# Patient Record
Sex: FEMALE | Race: WHITE | NOT HISPANIC OR LATINO | URBAN - METROPOLITAN AREA
[De-identification: names, ages, dates, MRNs, and addresses within clinical notes are randomized per-mention and may not be internally consistent; named-entity substitution may affect disease eponyms.]

---

## 2018-09-02 ENCOUNTER — EMERGENCY (EMERGENCY)
Facility: HOSPITAL | Age: 53
LOS: 1 days | Discharge: HOME | End: 2018-09-02
Attending: EMERGENCY MEDICINE | Admitting: EMERGENCY MEDICINE
Payer: COMMERCIAL

## 2018-09-02 VITALS
SYSTOLIC BLOOD PRESSURE: 167 MMHG | RESPIRATION RATE: 16 BRPM | DIASTOLIC BLOOD PRESSURE: 75 MMHG | HEART RATE: 67 BPM | OXYGEN SATURATION: 99 %

## 2018-09-02 VITALS
HEART RATE: 75 BPM | DIASTOLIC BLOOD PRESSURE: 75 MMHG | SYSTOLIC BLOOD PRESSURE: 190 MMHG | OXYGEN SATURATION: 97 % | TEMPERATURE: 98 F | RESPIRATION RATE: 16 BRPM

## 2018-09-02 LAB
ALBUMIN SERPL ELPH-MCNC: 4 G/DL — SIGNIFICANT CHANGE UP (ref 3.3–5)
ALP SERPL-CCNC: 42 U/L — SIGNIFICANT CHANGE UP (ref 40–120)
ALT FLD-CCNC: 29 U/L — SIGNIFICANT CHANGE UP (ref 4–33)
APPEARANCE UR: CLEAR — SIGNIFICANT CHANGE UP
AST SERPL-CCNC: 19 U/L — SIGNIFICANT CHANGE UP (ref 4–32)
B-OH-BUTYR SERPL-SCNC: 0.2 MMOL/L — SIGNIFICANT CHANGE UP (ref 0–0.4)
BASE EXCESS BLDV CALC-SCNC: 3.1 MMOL/L — SIGNIFICANT CHANGE UP
BASE EXCESS BLDV CALC-SCNC: 4.2 MMOL/L — SIGNIFICANT CHANGE UP
BASOPHILS # BLD AUTO: 0.05 K/UL — SIGNIFICANT CHANGE UP (ref 0–0.2)
BASOPHILS NFR BLD AUTO: 0.7 % — SIGNIFICANT CHANGE UP (ref 0–2)
BILIRUB SERPL-MCNC: 0.9 MG/DL — SIGNIFICANT CHANGE UP (ref 0.2–1.2)
BILIRUB UR-MCNC: NEGATIVE — SIGNIFICANT CHANGE UP
BLOOD GAS VENOUS - CREATININE: < 0.36 MG/DL — LOW (ref 0.5–1.3)
BLOOD GAS VENOUS - CREATININE: < 0.36 MG/DL — LOW (ref 0.5–1.3)
BLOOD UR QL VISUAL: NEGATIVE — SIGNIFICANT CHANGE UP
BUN SERPL-MCNC: 14 MG/DL — SIGNIFICANT CHANGE UP (ref 7–23)
CALCIUM SERPL-MCNC: 9.4 MG/DL — SIGNIFICANT CHANGE UP (ref 8.4–10.5)
CHLORIDE BLDV-SCNC: 102 MMOL/L — SIGNIFICANT CHANGE UP (ref 96–108)
CHLORIDE BLDV-SCNC: 105 MMOL/L — SIGNIFICANT CHANGE UP (ref 96–108)
CHLORIDE SERPL-SCNC: 97 MMOL/L — LOW (ref 98–107)
CO2 SERPL-SCNC: 26 MMOL/L — SIGNIFICANT CHANGE UP (ref 22–31)
COLOR SPEC: SIGNIFICANT CHANGE UP
CREAT SERPL-MCNC: 0.51 MG/DL — SIGNIFICANT CHANGE UP (ref 0.5–1.3)
EOSINOPHIL # BLD AUTO: 0.12 K/UL — SIGNIFICANT CHANGE UP (ref 0–0.5)
EOSINOPHIL NFR BLD AUTO: 1.6 % — SIGNIFICANT CHANGE UP (ref 0–6)
GAS PNL BLDV: 134 MMOL/L — LOW (ref 136–146)
GAS PNL BLDV: 135 MMOL/L — LOW (ref 136–146)
GLUCOSE BLDV-MCNC: 219 — HIGH (ref 70–99)
GLUCOSE BLDV-MCNC: 276 — HIGH (ref 70–99)
GLUCOSE SERPL-MCNC: 271 MG/DL — HIGH (ref 70–99)
GLUCOSE UR-MCNC: HIGH
HCO3 BLDV-SCNC: 26 MMOL/L — SIGNIFICANT CHANGE UP (ref 20–27)
HCO3 BLDV-SCNC: 27 MMOL/L — SIGNIFICANT CHANGE UP (ref 20–27)
HCT VFR BLD CALC: 40 % — SIGNIFICANT CHANGE UP (ref 34.5–45)
HCT VFR BLDV CALC: 39.6 % — SIGNIFICANT CHANGE UP (ref 34.5–45)
HCT VFR BLDV CALC: 43.4 % — SIGNIFICANT CHANGE UP (ref 34.5–45)
HGB BLD-MCNC: 13.5 G/DL — SIGNIFICANT CHANGE UP (ref 11.5–15.5)
HGB BLDV-MCNC: 12.9 G/DL — SIGNIFICANT CHANGE UP (ref 11.5–15.5)
HGB BLDV-MCNC: 14.1 G/DL — SIGNIFICANT CHANGE UP (ref 11.5–15.5)
IMM GRANULOCYTES # BLD AUTO: 0.04 # — SIGNIFICANT CHANGE UP
IMM GRANULOCYTES NFR BLD AUTO: 0.5 % — SIGNIFICANT CHANGE UP (ref 0–1.5)
KETONES UR-MCNC: NEGATIVE — SIGNIFICANT CHANGE UP
LACTATE BLDV-MCNC: 1.3 MMOL/L — SIGNIFICANT CHANGE UP (ref 0.5–2)
LACTATE BLDV-MCNC: 2.2 MMOL/L — HIGH (ref 0.5–2)
LEUKOCYTE ESTERASE UR-ACNC: NEGATIVE — SIGNIFICANT CHANGE UP
LIDOCAIN IGE QN: 55.6 U/L — SIGNIFICANT CHANGE UP (ref 7–60)
LYMPHOCYTES # BLD AUTO: 1.58 K/UL — SIGNIFICANT CHANGE UP (ref 1–3.3)
LYMPHOCYTES # BLD AUTO: 20.7 % — SIGNIFICANT CHANGE UP (ref 13–44)
MCHC RBC-ENTMCNC: 27.7 PG — SIGNIFICANT CHANGE UP (ref 27–34)
MCHC RBC-ENTMCNC: 33.8 % — SIGNIFICANT CHANGE UP (ref 32–36)
MCV RBC AUTO: 82.1 FL — SIGNIFICANT CHANGE UP (ref 80–100)
MONOCYTES # BLD AUTO: 0.43 K/UL — SIGNIFICANT CHANGE UP (ref 0–0.9)
MONOCYTES NFR BLD AUTO: 5.6 % — SIGNIFICANT CHANGE UP (ref 2–14)
NEUTROPHILS # BLD AUTO: 5.43 K/UL — SIGNIFICANT CHANGE UP (ref 1.8–7.4)
NEUTROPHILS NFR BLD AUTO: 70.9 % — SIGNIFICANT CHANGE UP (ref 43–77)
NITRITE UR-MCNC: NEGATIVE — SIGNIFICANT CHANGE UP
NRBC # FLD: 0 — SIGNIFICANT CHANGE UP
PCO2 BLDV: 47 MMHG — SIGNIFICANT CHANGE UP (ref 41–51)
PCO2 BLDV: 49 MMHG — SIGNIFICANT CHANGE UP (ref 41–51)
PH BLDV: 7.38 PH — SIGNIFICANT CHANGE UP (ref 7.32–7.43)
PH BLDV: 7.4 PH — SIGNIFICANT CHANGE UP (ref 7.32–7.43)
PH UR: 6.5 — SIGNIFICANT CHANGE UP (ref 5–8)
PLATELET # BLD AUTO: 194 K/UL — SIGNIFICANT CHANGE UP (ref 150–400)
PMV BLD: 9.9 FL — SIGNIFICANT CHANGE UP (ref 7–13)
PO2 BLDV: 47 MMHG — HIGH (ref 35–40)
PO2 BLDV: 53 MMHG — HIGH (ref 35–40)
POTASSIUM BLDV-SCNC: 3.9 MMOL/L — SIGNIFICANT CHANGE UP (ref 3.4–4.5)
POTASSIUM BLDV-SCNC: 4 MMOL/L — SIGNIFICANT CHANGE UP (ref 3.4–4.5)
POTASSIUM SERPL-MCNC: 4 MMOL/L — SIGNIFICANT CHANGE UP (ref 3.5–5.3)
POTASSIUM SERPL-SCNC: 4 MMOL/L — SIGNIFICANT CHANGE UP (ref 3.5–5.3)
PROT SERPL-MCNC: 6.8 G/DL — SIGNIFICANT CHANGE UP (ref 6–8.3)
PROT UR-MCNC: NEGATIVE — SIGNIFICANT CHANGE UP
RBC # BLD: 4.87 M/UL — SIGNIFICANT CHANGE UP (ref 3.8–5.2)
RBC # FLD: 12.5 % — SIGNIFICANT CHANGE UP (ref 10.3–14.5)
SAO2 % BLDV: 80.1 % — SIGNIFICANT CHANGE UP (ref 60–85)
SAO2 % BLDV: 85.8 % — HIGH (ref 60–85)
SODIUM SERPL-SCNC: 138 MMOL/L — SIGNIFICANT CHANGE UP (ref 135–145)
SP GR SPEC: 1.01 — SIGNIFICANT CHANGE UP (ref 1–1.04)
TROPONIN T, HIGH SENSITIVITY: 7 NG/L — SIGNIFICANT CHANGE UP (ref ?–14)
TROPONIN T, HIGH SENSITIVITY: 7 NG/L — SIGNIFICANT CHANGE UP (ref ?–14)
UROBILINOGEN FLD QL: NORMAL — SIGNIFICANT CHANGE UP
WBC # BLD: 7.65 K/UL — SIGNIFICANT CHANGE UP (ref 3.8–10.5)
WBC # FLD AUTO: 7.65 K/UL — SIGNIFICANT CHANGE UP (ref 3.8–10.5)

## 2018-09-02 PROCEDURE — 70498 CT ANGIOGRAPHY NECK: CPT | Mod: 26

## 2018-09-02 PROCEDURE — 99285 EMERGENCY DEPT VISIT HI MDM: CPT

## 2018-09-02 PROCEDURE — 70496 CT ANGIOGRAPHY HEAD: CPT | Mod: 26

## 2018-09-02 PROCEDURE — 71046 X-RAY EXAM CHEST 2 VIEWS: CPT | Mod: 26

## 2018-09-02 PROCEDURE — 76705 ECHO EXAM OF ABDOMEN: CPT | Mod: 26

## 2018-09-02 PROCEDURE — 70450 CT HEAD/BRAIN W/O DYE: CPT | Mod: 26,59

## 2018-09-02 RX ORDER — SODIUM CHLORIDE 9 MG/ML
1000 INJECTION INTRAMUSCULAR; INTRAVENOUS; SUBCUTANEOUS ONCE
Qty: 0 | Refills: 0 | Status: COMPLETED | OUTPATIENT
Start: 2018-09-02 | End: 2018-09-02

## 2018-09-02 RX ORDER — METOCLOPRAMIDE HCL 10 MG
10 TABLET ORAL ONCE
Qty: 0 | Refills: 0 | Status: COMPLETED | OUTPATIENT
Start: 2018-09-02 | End: 2018-09-02

## 2018-09-02 RX ORDER — NEBIVOLOL HYDROCHLORIDE 5 MG/1
20 TABLET ORAL ONCE
Qty: 0 | Refills: 0 | Status: COMPLETED | OUTPATIENT
Start: 2018-09-02 | End: 2018-09-02

## 2018-09-02 RX ORDER — MORPHINE SULFATE 50 MG/1
4 CAPSULE, EXTENDED RELEASE ORAL ONCE
Qty: 0 | Refills: 0 | Status: DISCONTINUED | OUTPATIENT
Start: 2018-09-02 | End: 2018-09-02

## 2018-09-02 RX ORDER — MECLIZINE HCL 12.5 MG
25 TABLET ORAL ONCE
Qty: 0 | Refills: 0 | Status: COMPLETED | OUTPATIENT
Start: 2018-09-02 | End: 2018-09-02

## 2018-09-02 RX ORDER — MECLIZINE HCL 12.5 MG
1 TABLET ORAL
Qty: 12 | Refills: 0 | OUTPATIENT
Start: 2018-09-02 | End: 2018-09-05

## 2018-09-02 RX ADMIN — SODIUM CHLORIDE 1000 MILLILITER(S): 9 INJECTION INTRAMUSCULAR; INTRAVENOUS; SUBCUTANEOUS at 10:24

## 2018-09-02 RX ADMIN — SODIUM CHLORIDE 1000 MILLILITER(S): 9 INJECTION INTRAMUSCULAR; INTRAVENOUS; SUBCUTANEOUS at 13:23

## 2018-09-02 RX ADMIN — Medication 25 MILLIGRAM(S): at 13:23

## 2018-09-02 RX ADMIN — Medication 25 MILLIGRAM(S): at 10:27

## 2018-09-02 RX ADMIN — Medication 10 MILLIGRAM(S): at 10:27

## 2018-09-02 RX ADMIN — NEBIVOLOL HYDROCHLORIDE 20 MILLIGRAM(S): 5 TABLET ORAL at 16:47

## 2018-09-02 NOTE — CONSULT NOTE ADULT - SUBJECTIVE AND OBJECTIVE BOX
Neurology Consult    Reason for consult: Vertigo	    HPI:  53y Female PMH HTN, HLD, DM, R [place] palsy with residual R facial weakness presenting with dizziness. As per patient, had a transient episode of dizziness two weeks ago when she stood up rapidly, described as "room spinning around her", resolved almost instantly. On the thursday prior to ED presentation, patient had a very strong neck massage which caused considerable soreness after which she had some transient vertigo symptoms as well. On the day prior to admission, patient went to a salon where she had her hair washed with her neck positioned over a sink. After this, patient started feeling severe vertiginous symptoms, nausea, vomiting which continued constantly until today. Currently, patient feels vertigo has slightly improved after taking meclizine. Patient also complains of tinnitus, denies ear fullness/diplopia/weakness/numbness.     REVIEW OF SYSTEMS:  As above    MEDICATIONS    PMH: Bell palsy     PSH: No significant past surgical history    FAMILY HISTORY:    No history of dementia, strokes, or seizures     SOCIAL HISTORY:  No history of tobacco or alcohol use     Allergies    No Known Allergies    Intolerances    Vital Signs Last 24 Hrs  T(C): 36.4 (02 Sep 2018 09:00), Max: 36.4 (02 Sep 2018 09:00)  T(F): 97.5 (02 Sep 2018 09:00), Max: 97.5 (02 Sep 2018 09:00)  HR: 66 (02 Sep 2018 12:18) (66 - 75)  BP: 175/90 (02 Sep 2018 12:18) (175/90 - 196/93)  BP(mean): --  RR: 16 (02 Sep 2018 12:18) (16 - 16)  SpO2: 100% (02 Sep 2018 12:18) (97% - 100%)    Neurological Examination:    Mental Status: Patient is alert, awake, oriented X3. Patient is fluent, no dysarthria, no aphasia. Follows commands well and able to answer questions appropriately. Mood and affect normal.    Cranial Nerves: PERRL, EOMI, visual field intact, V1-V3 intact, no gross facial asymmetry, tongue/uvula midline    Motor Exam: No drift  Right upper extremity: 5/5  Left upper extremity: 5/5  Right lower extremity: 5/5  Left lower extremity: 5/5    Normal bulk/tone    Sensory: Intact to light touch bilaterally. No extinction    Coordination: Finger to nose intact bilaterally     Gait: Normal      Colp Hallpike negative    Reflexes: Bilateral 2+ Biceps, Brachial, Patellar, Ankle  Plantar: Down bilateral    GENERAL: No acute distress  HEENT:  Normocephalic, atraumatic  EXTREMITIES: No edema, clubbing, cyanosis  MUSCULOSKELETAL: Normal range of motion  SKIN: No rashes    LABS:  CBC Full  -  ( 02 Sep 2018 10:00 )  WBC Count : 7.65 K/uL  Hemoglobin : 13.5 g/dL  Hematocrit : 40.0 %  Platelet Count - Automated : 194 K/uL  Mean Cell Volume : 82.1 fL  Mean Cell Hemoglobin : 27.7 pg  Mean Cell Hemoglobin Concentration : 33.8 %  Auto Neutrophil # : 5.43 K/uL  Auto Lymphocyte # : 1.58 K/uL  Auto Monocyte # : 0.43 K/uL  Auto Eosinophil # : 0.12 K/uL  Auto Basophil # : 0.05 K/uL  Auto Neutrophil % : 70.9 %  Auto Lymphocyte % : 20.7 %  Auto Monocyte % : 5.6 %  Auto Eosinophil % : 1.6 %  Auto Basophil % : 0.7 %    Urinalysis Basic - ( 02 Sep 2018 10:00 )    Color: LIGHT YELLOW / Appearance: CLEAR / S.011 / pH: 6.5  Gluc: MODERATE / Ketone: NEGATIVE  / Bili: NEGATIVE / Urobili: NORMAL   Blood: NEGATIVE / Protein: NEGATIVE / Nitrite: NEGATIVE   Leuk Esterase: NEGATIVE / RBC: x / WBC x   Sq Epi: x / Non Sq Epi: x / Bacteria: x          138  |  97<L>  |  14  ----------------------------<  271<H>  4.0   |  26  |  0.51    Ca    9.4      02 Sep 2018 10:00    TPro  6.8  /  Alb  4.0  /  TBili  0.9  /  DBili  x   /  AST  19  /  ALT  29  /  AlkPhos  42  -    LIVER FUNCTIONS - ( 02 Sep 2018 10:00 )  Alb: 4.0 g/dL / Pro: 6.8 g/dL / ALK PHOS: 42 u/L / ALT: 29 u/L / AST: 19 u/L / GGT: x           Hemoglobin A1C:           RADIOLOGY:  CT head:  MRI:

## 2018-09-02 NOTE — ED PROVIDER NOTE - PHYSICAL EXAMINATION
laying flat in bed, dry mm. non icteric. no nystagmus. EOMI. slight facial droop (at baseline) no drooling. normal S1-S2 No resp distress. able to speak in full and clear sentences. no wheeze, rales or stridor. soft non distended tender at the RUQ, neg Ruiz no guarding no rebound   no pedal edema. no calf tenderness. normal pulses bilateral feet. AOx3, no focal deficits. CN 2-12 grossly intact. no meningeal signs.

## 2018-09-02 NOTE — CONSULT NOTE ADULT - ASSESSMENT
53y Female PMH HTN, HLD, DM, R [place] palsy with residual R facial weakness presenting with vertigo. Patient had a prior episode of vertigo a few weeks ago, positional, transient, also complaining of tinnitus; prior presentation most consistent with BPPV(though jesse hallpike currently negative). However, patient with several vascular comorbidities as well as recent history of neck manipulation/trauma, after which she experienced severe worsening of vertiginous symptoms, concerning for possible dissection.     Recommendations:  Continue meclizine, IVF  CTA H/N for possible dissection  Antiemetics for nausea/vomiting

## 2018-09-02 NOTE — ED ADULT NURSE NOTE - NSIMPLEMENTINTERV_GEN_ALL_ED
Implemented All Universal Safety Interventions:  [place] to call system. Call bell, personal items and telephone within reach. Instruct patient to call for assistance. Room bathroom lighting operational. Non-slip footwear when patient is off stretcher. Physically safe environment: no spills, clutter or unnecessary equipment. Stretcher in lowest position, wheels locked, appropriate side rails in place.

## 2018-09-02 NOTE — ED PROVIDER NOTE - OBJECTIVE STATEMENT
54 y/o F with a PMHx of Bell palsy presents to the ED c/o of dizziness, nausea and vomiting that began yesterday. Patient reports that yesterday morning she woke up and felt dizzy and went to work until 3 pm. After that the Patient went to the hair salon to get her hair done. When her head was tilted back the Patient states she started vomiting. The patient then went home and slept and after waking up felt fine. This morning upon awakening patient felt the same symptoms as yesterday which prompted her to come to the ED. Patient reports having a history of these symptoms 15 years ago. Denies LOC, numbness, weakness or any other related symptoms. 52 y/o F with a PMHx of HTN,  HLD, DM, Bell palsy with residual facial paralysis presents to the ED c/o of dizziness, nausea and vomiting that began yesterday. Patient reports that yesterday morning she woke up and felt dizzy and went to work until 3 pm. After that the Patient went to the hair salon to get her hair done. When her head was tilted back the Patient states she started vomiting. The patient then went home and slept and after waking up felt fine. This morning upon awakening patient felt the same symptoms as yesterday which prompted her to come to the ED. Patient reports having a history of these symptoms 15 years ago. Describes "room spinning" Dizziness, nausea no vomit. Upper right abdominal pain since this morning, non radiating. no chest pain no sob. no fever or chills. no diarrhea. no headache. no neck pain. no change in vision. no slurred speech. Denies LOC, numbness, weakness or any other related symptoms. no urinary complaints. not on AC> no change in meds. States FS have been in mid 200 last week. pt on Insulin for DM, cant recall other medication. no sick contact. no travel hx.

## 2018-09-02 NOTE — ED PROVIDER NOTE - PROGRESS NOTE DETAILS
pt endorsing "a little better" "still dizzy when move head back" "Im hungry" nausea improved. no numbness or weakness. pending us read. will order ct angio head/neck, consult neurology. pt informed. pt endorsing "a little better" "still dizzy when move head back"  nausea improved.  generalized weakness. pending us read. will order ct angio head/neck, consult neurology pt feeling better, able to walk now. not dizzy. nl gait. no focal deficits. no cp or sob. ct neg for dissection. pt asking to eat and would like to go home. Neurology consult appreciated. pt dressed, feeling better. missed her BP meds today. Ordered bystolic  from pharmacy. pt walked not dizzy. no cp or sob Repeat  not dizzy or lightheaded. no cp or sob. Feeling better. plan dc home.   Close pmd and neurology follow up. pt asking to be discharged home. DC w meclizine prescription

## 2018-09-03 LAB
BACTERIA UR CULT: SIGNIFICANT CHANGE UP
SPECIMEN SOURCE: SIGNIFICANT CHANGE UP

## 2018-09-07 LAB
BACTERIA BLD CULT: SIGNIFICANT CHANGE UP
BACTERIA BLD CULT: SIGNIFICANT CHANGE UP

## 2018-10-08 ENCOUNTER — APPOINTMENT (OUTPATIENT)
Dept: VASCULAR SURGERY | Facility: CLINIC | Age: 53
End: 2018-10-08
Payer: COMMERCIAL

## 2018-10-08 VITALS
TEMPERATURE: 98.3 F | BODY MASS INDEX: 34.02 KG/M2 | HEART RATE: 83 BPM | HEIGHT: 63 IN | SYSTOLIC BLOOD PRESSURE: 165 MMHG | DIASTOLIC BLOOD PRESSURE: 85 MMHG | WEIGHT: 192 LBS

## 2018-10-08 PROCEDURE — 93971 EXTREMITY STUDY: CPT

## 2018-10-08 PROCEDURE — 99242 OFF/OP CONSLTJ NEW/EST SF 20: CPT

## 2019-02-19 PROBLEM — G51.0 BELL'S PALSY: Chronic | Status: ACTIVE | Noted: 2018-09-02

## 2019-03-13 ENCOUNTER — APPOINTMENT (OUTPATIENT)
Dept: GASTROENTEROLOGY | Facility: CLINIC | Age: 54
End: 2019-03-13

## 2019-08-14 ENCOUNTER — APPOINTMENT (OUTPATIENT)
Dept: GASTROENTEROLOGY | Facility: CLINIC | Age: 54
End: 2019-08-14
Payer: COMMERCIAL

## 2019-08-14 VITALS
BODY MASS INDEX: 35.08 KG/M2 | SYSTOLIC BLOOD PRESSURE: 150 MMHG | TEMPERATURE: 97.8 F | HEIGHT: 63 IN | WEIGHT: 198 LBS | OXYGEN SATURATION: 98 % | DIASTOLIC BLOOD PRESSURE: 80 MMHG | HEART RATE: 84 BPM

## 2019-08-14 DIAGNOSIS — Z86.79 PERSONAL HISTORY OF OTHER DISEASES OF THE CIRCULATORY SYSTEM: ICD-10-CM

## 2019-08-14 DIAGNOSIS — Z78.9 OTHER SPECIFIED HEALTH STATUS: ICD-10-CM

## 2019-08-14 DIAGNOSIS — Z63.5 DISRUPTION OF FAMILY BY SEPARATION AND DIVORCE: ICD-10-CM

## 2019-08-14 DIAGNOSIS — Z82.49 FAMILY HISTORY OF ISCHEMIC HEART DISEASE AND OTHER DISEASES OF THE CIRCULATORY SYSTEM: ICD-10-CM

## 2019-08-14 DIAGNOSIS — Z86.39 PERSONAL HISTORY OF OTHER ENDOCRINE, NUTRITIONAL AND METABOLIC DISEASE: ICD-10-CM

## 2019-08-14 PROCEDURE — 99214 OFFICE O/P EST MOD 30 MIN: CPT

## 2019-08-14 RX ORDER — PEN NEEDLE, DIABETIC 29 G X1/2"
32G X 4 MM NEEDLE, DISPOSABLE MISCELLANEOUS
Qty: 270 | Refills: 0 | Status: ACTIVE | COMMUNITY
Start: 2019-03-26

## 2019-08-14 RX ORDER — DULAGLUTIDE 0.75 MG/.5ML
0.75 INJECTION, SOLUTION SUBCUTANEOUS
Qty: 6 | Refills: 0 | Status: ACTIVE | COMMUNITY
Start: 2019-07-10

## 2019-08-14 RX ORDER — BLOOD SUGAR DIAGNOSTIC
STRIP MISCELLANEOUS
Qty: 400 | Refills: 0 | Status: ACTIVE | COMMUNITY
Start: 2019-07-10

## 2019-08-14 RX ORDER — NEBIVOLOL HYDROCHLORIDE 10 MG/1
10 TABLET ORAL
Qty: 180 | Refills: 0 | Status: ACTIVE | COMMUNITY
Start: 2019-05-06

## 2019-08-14 RX ORDER — METFORMIN ER 500 MG 500 MG/1
500 TABLET ORAL
Qty: 360 | Refills: 0 | Status: ACTIVE | COMMUNITY
Start: 2019-03-13

## 2019-08-14 RX ORDER — ERGOCALCIFEROL 1.25 MG/1
1.25 MG CAPSULE, LIQUID FILLED ORAL
Qty: 4 | Refills: 0 | Status: ACTIVE | COMMUNITY
Start: 2019-05-11

## 2019-08-14 RX ORDER — POLYMYXIN B SULFATE AND TRIMETHOPRIM 10000; 1 [USP'U]/ML; MG/ML
10000-0.1 SOLUTION OPHTHALMIC
Qty: 10 | Refills: 0 | Status: ACTIVE | COMMUNITY
Start: 2019-05-02

## 2019-08-14 RX ORDER — INSULIN GLARGINE 100 [IU]/ML
100 INJECTION, SOLUTION SUBCUTANEOUS
Qty: 75 | Refills: 0 | Status: ACTIVE | COMMUNITY
Start: 2019-04-17

## 2019-08-14 RX ORDER — FLUTICASONE PROPIONATE 50 UG/1
50 SPRAY, METERED NASAL
Qty: 16 | Refills: 0 | Status: ACTIVE | COMMUNITY
Start: 2019-06-04

## 2019-08-14 RX ORDER — ALPRAZOLAM 0.25 MG/1
0.25 TABLET ORAL
Qty: 60 | Refills: 0 | Status: ACTIVE | COMMUNITY
Start: 2019-05-10

## 2019-08-14 RX ORDER — FLASH GLUCOSE SENSOR
KIT MISCELLANEOUS
Qty: 2 | Refills: 0 | Status: ACTIVE | COMMUNITY
Start: 2019-03-28

## 2019-08-14 RX ORDER — GLIMEPIRIDE 4 MG/1
4 TABLET ORAL
Qty: 180 | Refills: 0 | Status: ACTIVE | COMMUNITY
Start: 2019-03-26

## 2019-08-14 RX ORDER — LANCETS
EACH MISCELLANEOUS
Qty: 408 | Refills: 0 | Status: ACTIVE | COMMUNITY
Start: 2019-03-26

## 2019-08-14 RX ORDER — LANCING DEVICE/LANCETS
KIT MISCELLANEOUS
Qty: 1 | Refills: 0 | Status: ACTIVE | COMMUNITY
Start: 2019-03-29

## 2019-08-14 SDOH — SOCIAL STABILITY - SOCIAL INSECURITY: DISRUPTION OF FAMILY BY SEPARATION AND DIVORCE: Z63.5

## 2019-08-14 NOTE — PHYSICAL EXAM
[General Appearance - Alert] : alert [Sclera] : the sclera and conjunctiva were normal [General Appearance - In No Acute Distress] : in no acute distress [PERRL With Normal Accommodation] : pupils were equal in size, round, and reactive to light [Extraocular Movements] : extraocular movements were intact [Outer Ear] : the ears and nose were normal in appearance [Oropharynx] : the oropharynx was normal [Neck Appearance] : the appearance of the neck was normal [Jugular Venous Distention Increased] : there was no jugular-venous distention [Neck Cervical Mass (___cm)] : no neck mass was observed [Thyroid Diffuse Enlargement] : the thyroid was not enlarged [Thyroid Nodule] : there were no palpable thyroid nodules [Auscultation Breath Sounds / Voice Sounds] : lungs were clear to auscultation bilaterally [Normal] : normal [Soft, Nontender] : the abdomen was soft and nontender [No HSM] : no hepatosplenomegaly noted [No Mass] : no masses were palpated [No CVA Tenderness] : no ~M costovertebral angle tenderness [No Spinal Tenderness] : no spinal tenderness [Abnormal Walk] : normal gait [Nail Clubbing] : no clubbing  or cyanosis of the fingernails [Musculoskeletal - Swelling] : no joint swelling seen [Motor Tone] : muscle strength and tone were normal [Skin Color & Pigmentation] : normal skin color and pigmentation [Skin Turgor] : normal skin turgor [Oriented To Time, Place, And Person] : oriented to person, place, and time [] : no rash [Affect] : the affect was normal [Impaired Insight] : insight and judgment were intact

## 2019-08-14 NOTE — HISTORY OF PRESENT ILLNESS
[de-identified] : NAFLD - sono this year \par \par colon 5 years ago \par \par  A low FODMAP diet was discussed with the patient at length. The patient had multiple questions all of which were answered. I recommended a nutritionist. Also recommended that the patient keep a food diary. We discussed  options such as Vegetables. Fresh fruits. Dairy that is lactose-free, and hard cheeses, or ripened/matured cheeses including... Beef, pork, chicken, fish, eggs. Avoid breadcrumbs, marinades, and sauces/gravies that may be high in FODMAPs. Soy products including tofu, tempeh. Grains.\par \par \par A low acid / reflux diet was discussed in great detail including  not smoking, not drinking alcohol, and not consuming foods that irritate the esophagus. It is helpful to eat small meals throughout the day instead of large meals. You should avoid eating before bedtime or lying down after you eat. It can be helpful to raise the head of your bed six inches. Additionally, you should maintain a healthy weight and good posture.. The patient was given written material to take home and review.\par

## 2019-08-21 ENCOUNTER — APPOINTMENT (OUTPATIENT)
Dept: VASCULAR SURGERY | Facility: CLINIC | Age: 54
End: 2019-08-21

## 2019-09-18 ENCOUNTER — APPOINTMENT (OUTPATIENT)
Dept: VASCULAR SURGERY | Facility: CLINIC | Age: 54
End: 2019-09-18
Payer: COMMERCIAL

## 2019-09-18 VITALS
SYSTOLIC BLOOD PRESSURE: 174 MMHG | TEMPERATURE: 98 F | DIASTOLIC BLOOD PRESSURE: 75 MMHG | HEIGHT: 63 IN | HEART RATE: 76 BPM | BODY MASS INDEX: 34.38 KG/M2 | WEIGHT: 194 LBS

## 2019-09-18 DIAGNOSIS — I83.811 VARICOSE VEINS OF RIGHT LOWER EXTREMITY WITH PAIN: ICD-10-CM

## 2019-09-18 PROCEDURE — 99213 OFFICE O/P EST LOW 20 MIN: CPT

## 2019-09-18 PROCEDURE — 93971 EXTREMITY STUDY: CPT

## 2019-09-23 ENCOUNTER — APPOINTMENT (OUTPATIENT)
Dept: GASTROENTEROLOGY | Facility: AMBULATORY MEDICAL SERVICES | Age: 54
End: 2019-09-23

## 2019-10-01 ENCOUNTER — CLINICAL ADVICE (OUTPATIENT)
Age: 54
End: 2019-10-01

## 2019-11-07 RX ORDER — SODIUM BICARBONATE 84 MG/ML
8.4 INJECTION, SOLUTION INTRAVENOUS
Qty: 1 | Refills: 0 | Status: ACTIVE | COMMUNITY
Start: 2019-11-07 | End: 1900-01-01

## 2019-11-07 RX ORDER — ALPRAZOLAM 0.5 MG/1
0.5 TABLET ORAL
Qty: 1 | Refills: 0 | Status: ACTIVE | COMMUNITY
Start: 2019-11-07 | End: 1900-01-01

## 2019-11-07 RX ORDER — LIDOCAINE HYDROCHLORIDE 10 MG/ML
1 INJECTION, SOLUTION INFILTRATION; PERINEURAL
Qty: 5 | Refills: 0 | Status: ACTIVE | COMMUNITY
Start: 2019-11-07 | End: 1900-01-01

## 2019-11-07 RX ORDER — SODIUM CHLORIDE 9 G/ML
0.9 INJECTION, SOLUTION INTRAVENOUS
Qty: 1 | Refills: 0 | Status: ACTIVE | COMMUNITY
Start: 2019-11-07 | End: 1900-01-01

## 2019-11-13 ENCOUNTER — APPOINTMENT (OUTPATIENT)
Dept: VASCULAR SURGERY | Facility: CLINIC | Age: 54
End: 2019-11-13

## 2019-12-11 ENCOUNTER — APPOINTMENT (OUTPATIENT)
Dept: VASCULAR SURGERY | Facility: CLINIC | Age: 54
End: 2019-12-11

## 2019-12-16 ENCOUNTER — APPOINTMENT (OUTPATIENT)
Dept: VASCULAR SURGERY | Facility: CLINIC | Age: 54
End: 2019-12-16

## 2019-12-30 ENCOUNTER — APPOINTMENT (OUTPATIENT)
Dept: GASTROENTEROLOGY | Facility: AMBULATORY MEDICAL SERVICES | Age: 54
End: 2019-12-30

## 2020-01-15 ENCOUNTER — APPOINTMENT (OUTPATIENT)
Dept: VASCULAR SURGERY | Facility: CLINIC | Age: 55
End: 2020-01-15

## 2020-09-30 ENCOUNTER — APPOINTMENT (OUTPATIENT)
Dept: GASTROENTEROLOGY | Facility: CLINIC | Age: 55
End: 2020-09-30
Payer: COMMERCIAL

## 2020-09-30 VITALS
SYSTOLIC BLOOD PRESSURE: 146 MMHG | OXYGEN SATURATION: 98 % | TEMPERATURE: 98.1 F | BODY MASS INDEX: 32.78 KG/M2 | HEIGHT: 63 IN | DIASTOLIC BLOOD PRESSURE: 71 MMHG | WEIGHT: 185 LBS | RESPIRATION RATE: 17 BRPM | HEART RATE: 85 BPM

## 2020-09-30 DIAGNOSIS — Z12.11 ENCOUNTER FOR SCREENING FOR MALIGNANT NEOPLASM OF COLON: ICD-10-CM

## 2020-09-30 PROCEDURE — 99214 OFFICE O/P EST MOD 30 MIN: CPT

## 2020-09-30 RX ORDER — SODIUM SULFATE, POTASSIUM SULFATE, MAGNESIUM SULFATE 17.5; 3.13; 1.6 G/ML; G/ML; G/ML
17.5-3.13-1.6 SOLUTION, CONCENTRATE ORAL
Qty: 1 | Refills: 0 | Status: ACTIVE | COMMUNITY
Start: 2020-09-30 | End: 1900-01-01

## 2020-09-30 RX ORDER — SODIUM SULFATE, POTASSIUM SULFATE, MAGNESIUM SULFATE 17.5; 3.13; 1.6 G/ML; G/ML; G/ML
17.5-3.13-1.6 SOLUTION, CONCENTRATE ORAL
Qty: 1 | Refills: 0 | Status: ACTIVE | COMMUNITY
Start: 2019-08-14 | End: 1900-01-01

## 2020-09-30 NOTE — HISTORY OF PRESENT ILLNESS
[de-identified] : Diarrhea / nausea - Likely Trulicuty\par \par Needs colon \par \par recent ST Normal\par \par CV clear on AC

## 2020-09-30 NOTE — PHYSICAL EXAM
[General Appearance - Alert] : alert [General Appearance - In No Acute Distress] : in no acute distress [Sclera] : the sclera and conjunctiva were normal [PERRL With Normal Accommodation] : pupils were equal in size, round, and reactive to light [Extraocular Movements] : extraocular movements were intact [Outer Ear] : the ears and nose were normal in appearance [Oropharynx] : the oropharynx was normal [Neck Appearance] : the appearance of the neck was normal [Neck Cervical Mass (___cm)] : no neck mass was observed [Jugular Venous Distention Increased] : there was no jugular-venous distention [Thyroid Diffuse Enlargement] : the thyroid was not enlarged [Thyroid Nodule] : there were no palpable thyroid nodules [Auscultation Breath Sounds / Voice Sounds] : lungs were clear to auscultation bilaterally [Heart Rate And Rhythm] : heart rate was normal and rhythm regular [Heart Sounds] : normal S1 and S2 [Heart Sounds Gallop] : no gallops [Murmurs] : no murmurs [Heart Sounds Pericardial Friction Rub] : no pericardial rub [Normal] : normal [Soft, Nontender] : the abdomen was soft and nontender [No Mass] : no masses were palpated [No HSM] : no hepatosplenomegaly noted [Abnormal Walk] : normal gait [Nail Clubbing] : no clubbing  or cyanosis of the fingernails [Musculoskeletal - Swelling] : no joint swelling seen [Motor Tone] : muscle strength and tone were normal [Skin Color & Pigmentation] : normal skin color and pigmentation [Skin Turgor] : normal skin turgor [] : no rash [Deep Tendon Reflexes (DTR)] : deep tendon reflexes were 2+ and symmetric [Sensation] : the sensory exam was normal to light touch and pinprick [No Focal Deficits] : no focal deficits [Oriented To Time, Place, And Person] : oriented to person, place, and time [Impaired Insight] : insight and judgment were intact [Affect] : the affect was normal

## 2020-10-22 ENCOUNTER — APPOINTMENT (OUTPATIENT)
Dept: GASTROENTEROLOGY | Facility: AMBULATORY MEDICAL SERVICES | Age: 55
End: 2020-10-22
Payer: COMMERCIAL

## 2020-10-22 PROCEDURE — 45380 COLONOSCOPY AND BIOPSY: CPT | Mod: 33

## 2020-11-17 ENCOUNTER — NON-APPOINTMENT (OUTPATIENT)
Age: 55
End: 2020-11-17

## 2022-06-08 ENCOUNTER — APPOINTMENT (OUTPATIENT)
Dept: ORTHOPEDIC SURGERY | Facility: CLINIC | Age: 57
End: 2022-06-08
Payer: COMMERCIAL

## 2022-06-08 VITALS — HEIGHT: 63 IN | WEIGHT: 185 LBS | BODY MASS INDEX: 32.78 KG/M2

## 2022-06-08 PROCEDURE — 99214 OFFICE O/P EST MOD 30 MIN: CPT

## 2022-06-08 RX ORDER — CYCLOBENZAPRINE HYDROCHLORIDE 5 MG/1
5 TABLET, FILM COATED ORAL
Qty: 20 | Refills: 0 | Status: ACTIVE | COMMUNITY
Start: 2022-06-08 | End: 1900-01-01

## 2022-06-08 NOTE — IMAGING
[de-identified] : Left knee exam: \par \par General: no distress, no ligamentous laxity\par Skin: no significant pertinent finding\par Inspection: \par    Effusion: (-)\par    Malalignment: (-)\par    Swelling: (-)\par    Quad atrophy: (-)\par    J-sign: (-)\par ROM: \par    0 - 135 degrees of flexion.\par Tenderness: \par    MJLT: +\par    LJLT: (-)\par    Medial patellar facet tenderness: (-)\par    Lateral patellar facet tenderness: (-)\par    Crepitus: (-)\par    Patellar grind tenderness: (-)\par Stability: \par    Lachman: (-)\par    Varus/Valgus instability: (-)\par    Posterior drawer: (-)\par Additional tests: \par    McMurrays test: equivocal\par    Patellar apprehension: (-)\par    Patellar tilt: (-)\par    Tight lateral retinaculum: (-)\par Strength: 5/5 Q/H/TA/GS/EHL\par Neuro: In tact to light touch throughout, DTR's wnl\par Vascularity: Extremity warm and well perfused\par Gait: normal.\par \par \par

## 2022-06-08 NOTE — HISTORY OF PRESENT ILLNESS
[10] : 10 [9] : 9 [Dull/Aching] : dull/aching [de-identified] : 56 y/o F with knee left pain for 2 weeks. History of torn meniscus, had euflexxa shots to the left knee with some relieve. Has done PT for knee. FS this am 230. \par \par on plavix [] : Post Surgical Visit: no [FreeTextEntry1] : left knee  [de-identified] : none

## 2022-06-08 NOTE — ASSESSMENT
[FreeTextEntry1] : 56 y/o F presenting for left knee pain with acute flare. Unable to get CSI due to elevated sugars.History of euflexxa  shots with some relieve. PT rx ordered. submit for HA. will obtain new mri left knee to eval for tear progression and consider arthroscopic management. \par \par The patient was advised of the diagnosis.  The natural history of the pathology was explained in full. All questions were answered.  The risks and benefits of conservative and interventional treatment alternatives were explained to the patient\par \par \par

## 2022-06-10 RX ORDER — HYALURONATE SODIUM 30 MG/2 ML
30 SYRINGE (ML) INTRAARTICULAR
Qty: 4 | Refills: 0 | Status: ACTIVE | COMMUNITY
Start: 2022-06-10 | End: 1900-01-01

## 2022-06-12 ENCOUNTER — FORM ENCOUNTER (OUTPATIENT)
Age: 57
End: 2022-06-12

## 2022-06-13 ENCOUNTER — APPOINTMENT (OUTPATIENT)
Dept: MRI IMAGING | Facility: CLINIC | Age: 57
End: 2022-06-13

## 2022-06-13 PROCEDURE — 73721 MRI JNT OF LWR EXTRE W/O DYE: CPT | Mod: LT

## 2022-06-15 ENCOUNTER — APPOINTMENT (OUTPATIENT)
Dept: GASTROENTEROLOGY | Facility: CLINIC | Age: 57
End: 2022-06-15
Payer: COMMERCIAL

## 2022-06-15 VITALS
WEIGHT: 182 LBS | HEIGHT: 63 IN | DIASTOLIC BLOOD PRESSURE: 81 MMHG | SYSTOLIC BLOOD PRESSURE: 175 MMHG | RESPIRATION RATE: 17 BRPM | OXYGEN SATURATION: 98 % | BODY MASS INDEX: 32.25 KG/M2 | TEMPERATURE: 97.2 F | HEART RATE: 75 BPM

## 2022-06-15 PROCEDURE — 99214 OFFICE O/P EST MOD 30 MIN: CPT

## 2022-06-15 NOTE — PHYSICAL EXAM
[General Appearance - Alert] : alert [General Appearance - In No Acute Distress] : in no acute distress [Sclera] : the sclera and conjunctiva were normal [PERRL With Normal Accommodation] : pupils were equal in size, round, and reactive to light [Extraocular Movements] : extraocular movements were intact [Outer Ear] : the ears and nose were normal in appearance [Oropharynx] : the oropharynx was normal [Neck Appearance] : the appearance of the neck was normal [Neck Cervical Mass (___cm)] : no neck mass was observed [Jugular Venous Distention Increased] : there was no jugular-venous distention [Thyroid Diffuse Enlargement] : the thyroid was not enlarged [Thyroid Nodule] : there were no palpable thyroid nodules [Auscultation Breath Sounds / Voice Sounds] : lungs were clear to auscultation bilaterally [Heart Rate And Rhythm] : heart rate was normal and rhythm regular [Heart Sounds] : normal S1 and S2 [Heart Sounds Gallop] : no gallops [Murmurs] : no murmurs [Heart Sounds Pericardial Friction Rub] : no pericardial rub [Normal] : normal [Soft, Nontender] : the abdomen was soft and nontender [No Mass] : no masses were palpated [No HSM] : no hepatosplenomegaly noted [No CVA Tenderness] : no ~M costovertebral angle tenderness [No Spinal Tenderness] : no spinal tenderness [Abnormal Walk] : normal gait [Nail Clubbing] : no clubbing  or cyanosis of the fingernails [Musculoskeletal - Swelling] : no joint swelling seen [Motor Tone] : muscle strength and tone were normal [Skin Color & Pigmentation] : normal skin color and pigmentation [Skin Turgor] : normal skin turgor [] : no rash [Deep Tendon Reflexes (DTR)] : deep tendon reflexes were 2+ and symmetric [No Focal Deficits] : no focal deficits [Sensation] : the sensory exam was normal to light touch and pinprick [Impaired Insight] : insight and judgment were intact [Oriented To Time, Place, And Person] : oriented to person, place, and time [Affect] : the affect was normal

## 2022-06-15 NOTE — ASSESSMENT
[FreeTextEntry1] : The patient received education including the attached :\par \par Nonalcoholic fatty liver disease (NAFLD) is an umbrella term for a range of liver conditions affecting people who drink little to no alcohol. As the name implies, the main characteristic of NAFLD is too much fat stored in liver cells.\par NAFLD is increasingly common around the world, especially in Western nations. In the [place], it is the most common form of chronic liver disease, affecting about one-quarter of the population.\par Some individuals with NAFLD can develop nonalcoholic steatohepatitis (HCEK), an aggressive form of fatty liver disease, which is marked by liver inflammation and may progress to advanced scarring (cirrhosis) and liver failure. This damage is similar to the damage caused by heavy alcohol use.\par \par Choose a healthy diet. Choose a healthy plant-based diet that's rich in fruits, vegetables, whole grains and healthy fats. Maintain a healthy weight. If you are overweight or obese, reduce the number of calories you eat each day and get more exercise. If you have a healthy weight, work to maintain it by choosing a healthy diet and exercising. Exercise. Exercise most days of the week. Get an OK from your doctor first if you haven't been exercising regularly\par \par We will repeat blood tests in 6months and a sonogram yearly\par \par

## 2022-07-12 ENCOUNTER — APPOINTMENT (OUTPATIENT)
Dept: ORTHOPEDIC SURGERY | Facility: CLINIC | Age: 57
End: 2022-07-12
Payer: COMMERCIAL

## 2022-07-12 VITALS — HEIGHT: 63 IN | WEIGHT: 182 LBS | BODY MASS INDEX: 32.25 KG/M2

## 2022-07-12 DIAGNOSIS — M47.27 OTHER SPONDYLOSIS WITH RADICULOPATHY, LUMBOSACRAL REGION: ICD-10-CM

## 2022-07-12 PROCEDURE — 99214 OFFICE O/P EST MOD 30 MIN: CPT | Mod: 25

## 2022-07-12 PROCEDURE — 20610 DRAIN/INJ JOINT/BURSA W/O US: CPT

## 2022-07-12 RX ORDER — GABAPENTIN 100 MG/1
100 CAPSULE ORAL 3 TIMES DAILY
Qty: 63 | Refills: 0 | Status: ACTIVE | COMMUNITY
Start: 2022-07-12 | End: 1900-01-01

## 2022-07-12 NOTE — DISCUSSION/SUMMARY
[de-identified] : 58 y/o F presenting for left knee pain with acute flare. Unable to get CSI due to elevated sugars.History of euflexxa  shots with some relieve. PT rx ordered. submit for HA. will obtain new mri left knee to eval for tear progression and consider arthroscopic management. \par \par she has concomitant sciatic pain. we will give gabapentin and start PT\par \par evaluated and discused left knee mri . signifian menicus root tear with only mild oa.  orthovisc #1 left knee today.\par \par The patient was advised of the diagnosis.  The natural history of the pathology was explained in full. All questions were answered.  The risks and benefits of conservative and interventional treatment alternatives were explained to the patient\par \par Viscosupplementation injection given:\par \par Viscosupplementation injection indications at this time include- X-ray evidence of osteoarthritis on this or prior visits, and patient having tried OTC's including aspirin, Ibuprofen, Aleve etc or prescription NSAIDS, and/or exercises at home and/ or physical therapy without satisfactory response.\par \par The risks, benefits, and alternatives to viscosupplementation injection were explained in full to the patient. Risks outlined include but are not limited to infection, sepsis, bleeding, scarring, skin discoloration, temporary increase in pain, syncopal episode, failure to resolve symptoms, allergic reaction, and symptom recurrence.  Patient understood the risks. All questions were answered. After discussion of options, the patient requested viscosupplementation. \par \par An injection of Hyaluronic acid of appropriate formulation was injected into the joint(s) after verbal consent, using sterile technique. The patient tolerated the procedure well and there were no complications.  Instructed patient to apply ice to the injection site. Signs and symptoms of infection reviewed and patient advised to call immediately for redness, fevers, and/or chills.\par \par

## 2022-07-12 NOTE — IMAGING
[de-identified] : Left knee exam: \par \par General: no distress, no ligamentous laxity\par Skin: no significant pertinent finding\par Inspection: \par    Effusion: (-)\par    Malalignment: (-)\par    Swelling: (-)\par    Quad atrophy: (-)\par    J-sign: (-)\par ROM: \par    0 - 135 degrees of flexion.\par Tenderness: \par    MJLT: +\par    LJLT: (-)\par    Medial patellar facet tenderness: (-)\par    Lateral patellar facet tenderness: (-)\par    Crepitus: (-)\par    Patellar grind tenderness: (-)\par Stability: \par    Lachman: (-)\par    Varus/Valgus instability: (-)\par    Posterior drawer: (-)\par Additional tests: \par    McMurrays test: equivocal\par    Patellar apprehension: (-)\par    Patellar tilt: (-)\par    Tight lateral retinaculum: (-)\par Strength: 5/5 Q/H/TA/GS/EHL\par Neuro: In tact to light touch throughout, DTR's wnl\par Vascularity: Extremity warm and well perfused\par Gait: normal.\par \par \par Thoracic/Lumbar spine exam: \par \par Skin: no significant pertinent finding\par Inspection: Abnormal alignment (kyphosis/lordosis): (-)     Atrophy: (-)     Masses: (-)\par ROM: \par    Pain:           Flexion/extension: (-)   .   Rotation: (-)    \par    Stiffness:   Flexion/extension: (-)   .   Rotation: (-)\par Tenderness/Spasm: \par    Midline: (-)\par    Lumbar paraspinal:         Right: (-).   Left: +\par    Thoracic paraspinal:       Right: (-)   .   Left: (-)   \par    PSIS:                               Right: (-)   .   Left: (-)\par    SI joint:                            Right: (-)   .   Left: (-)\par    Greater troch:                 Right: (-)   .   Left: +\par    Hamstring tightness: (-)\par Strength: (out of 5)\par    Illiopsoas:           Right: 5   .    Left: 5\par    Quad:                 Right: 5   .    Left: 5\par    Hamstrings:        Right: 5   .    Left: 5\par    Anterior tibialis:  Right: 5    .   Left: 5\par    Gastrocsoleus:  Right: 5    .   Left: 5\par    EHL:                    Right: 5    .   Left: 5\par Neuro: DTR's wnl.  Sensation to light touch grossly in tact in all distributions. \par    SLR: (-)\par    Femoral stretch: (-)\par Vascularity: Extremity warm and well perfused\par Gait: normal\par \par

## 2022-07-12 NOTE — HISTORY OF PRESENT ILLNESS
[10] : 10 [9] : 9 [Dull/Aching] : dull/aching [Shooting] : shooting [Lying in bed] : lying in bed [1] : 1 [Orthovisc] : Orthovisc [de-identified] : 56 y/o F with knee left pain for 2 weeks. History of torn meniscus, had euflexxa shots to the left knee with some relieve. Has done PT for knee. FS this am 230. \par \par on plavix\par \par notes she has significant left knee pain. and new onset shooting pain down her left leg.   BS today is 267 [] : Post Surgical Visit: no [FreeTextEntry1] : left knee  [de-identified] : none

## 2022-07-19 ENCOUNTER — APPOINTMENT (OUTPATIENT)
Dept: ORTHOPEDIC SURGERY | Facility: CLINIC | Age: 57
End: 2022-07-19

## 2022-07-19 VITALS — WEIGHT: 182 LBS | BODY MASS INDEX: 32.25 KG/M2 | HEIGHT: 63 IN

## 2022-07-19 DIAGNOSIS — M23.307 OTHER MENISCUS DERANGEMENTS, UNSPECIFIED MENISCUS, LEFT KNEE: ICD-10-CM

## 2022-07-19 PROCEDURE — 99024 POSTOP FOLLOW-UP VISIT: CPT

## 2022-07-19 PROCEDURE — 20610 DRAIN/INJ JOINT/BURSA W/O US: CPT | Mod: LT

## 2022-07-19 NOTE — HISTORY OF PRESENT ILLNESS
[10] : 10 [9] : 9 [Dull/Aching] : dull/aching [Shooting] : shooting [Lying in bed] : lying in bed [2] : 2 [Orthovisc] : Orthovisc [de-identified] : 56 y/o F with knee left pain for 2 weeks. History of torn meniscus, had euflexxa shots to the left knee with some relieve. Has done PT for knee. FS this am 230. \par \par on plavix\par \par today for Orthovisc #2 L knee  [] : Post Surgical Visit: no [FreeTextEntry1] : left knee  [de-identified] : none  [de-identified] : 7/12/22

## 2022-07-19 NOTE — IMAGING
[de-identified] : Left knee exam: \par \par General: no distress, no ligamentous laxity\par Skin: no significant pertinent finding\par Inspection: \par    Effusion: (-)\par    Malalignment: (-)\par    Swelling: (-)\par    Quad atrophy: (-)\par    J-sign: (-)\par ROM: \par    0 - 135 degrees of flexion.\par Tenderness: \par    MJLT: +\par    LJLT: (-)\par    Medial patellar facet tenderness: (-)\par    Lateral patellar facet tenderness: (-)\par    Crepitus: (-)\par    Patellar grind tenderness: (-)\par Stability: \par    Lachman: (-)\par    Varus/Valgus instability: (-)\par    Posterior drawer: (-)\par Additional tests: \par    McMurrays test: equivocal\par    Patellar apprehension: (-)\par    Patellar tilt: (-)\par    Tight lateral retinaculum: (-)\par Strength: 5/5 Q/H/TA/GS/EHL\par Neuro: In tact to light touch throughout, DTR's wnl\par Vascularity: Extremity warm and well perfused\par Gait: normal.\par \par \par Thoracic/Lumbar spine exam: \par \par Skin: no significant pertinent finding\par Inspection: Abnormal alignment (kyphosis/lordosis): (-)     Atrophy: (-)     Masses: (-)\par ROM: \par    Pain:           Flexion/extension: (-)   .   Rotation: (-)    \par    Stiffness:   Flexion/extension: (-)   .   Rotation: (-)\par Tenderness/Spasm: \par    Midline: (-)\par    Lumbar paraspinal:         Right: (-).   Left: +\par    Thoracic paraspinal:       Right: (-)   .   Left: (-)   \par    PSIS:                               Right: (-)   .   Left: (-)\par    SI joint:                            Right: (-)   .   Left: (-)\par    Greater troch:                 Right: (-)   .   Left: +\par    Hamstring tightness: (-)\par Strength: (out of 5)\par    Illiopsoas:           Right: 5   .    Left: 5\par    Quad:                 Right: 5   .    Left: 5\par    Hamstrings:        Right: 5   .    Left: 5\par    Anterior tibialis:  Right: 5    .   Left: 5\par    Gastrocsoleus:  Right: 5    .   Left: 5\par    EHL:                    Right: 5    .   Left: 5\par Neuro: DTR's wnl.  Sensation to light touch grossly in tact in all distributions. \par    SLR: (-)\par    Femoral stretch: (-)\par Vascularity: Extremity warm and well perfused\par Gait: normal\par \par

## 2022-07-19 NOTE — DISCUSSION/SUMMARY
[de-identified] : 56 y/o F presenting for left knee pain with acute flare. Unable to get CSI due to elevated sugars.History of euflexxa  shots with some relieve. PT rx ordered. submit for HA. will obtain new mri left knee to eval for tear progression and consider arthroscopic management. \par \par she has concomitant sciatic pain. we will give gabapentin and start PT\par evaluated and discused left knee mri . signifian menicus root tear with only mild oa. \par \par orthovisc #2 left knee today.\par \par The patient was advised of the diagnosis.  The natural history of the pathology was explained in full. All questions were answered.  The risks and benefits of conservative and interventional treatment alternatives were explained to the patient\par \par Viscosupplementation injection given:\par \par Viscosupplementation injection indications at this time include- X-ray evidence of osteoarthritis on this or prior visits, and patient having tried OTC's including aspirin, Ibuprofen, Aleve etc or prescription NSAIDS, and/or exercises at home and/ or physical therapy without satisfactory response.\par \par The risks, benefits, and alternatives to viscosupplementation injection were explained in full to the patient. Risks outlined include but are not limited to infection, sepsis, bleeding, scarring, skin discoloration, temporary increase in pain, syncopal episode, failure to resolve symptoms, allergic reaction, and symptom recurrence.  Patient understood the risks. All questions were answered. After discussion of options, the patient requested viscosupplementation. \par \par An injection of Hyaluronic acid of appropriate formulation was injected into the joint(s) after verbal consent, using sterile technique. The patient tolerated the procedure well and there were no complications.  Instructed patient to apply ice to the injection site. Signs and symptoms of infection reviewed and patient advised to call immediately for redness, fevers, and/or chills.\par \par

## 2022-07-28 ENCOUNTER — APPOINTMENT (OUTPATIENT)
Dept: ORTHOPEDIC SURGERY | Facility: CLINIC | Age: 57
End: 2022-07-28

## 2022-07-28 VITALS — BODY MASS INDEX: 32.25 KG/M2 | WEIGHT: 182 LBS | HEIGHT: 63 IN

## 2022-07-28 PROCEDURE — 20610 DRAIN/INJ JOINT/BURSA W/O US: CPT

## 2022-07-28 PROCEDURE — 99212 OFFICE O/P EST SF 10 MIN: CPT | Mod: 25

## 2022-07-28 NOTE — HISTORY OF PRESENT ILLNESS
[10] : 10 [9] : 9 [Dull/Aching] : dull/aching [Shooting] : shooting [Lying in bed] : lying in bed [3] : 3 [Orthovisc] : Orthovisc [de-identified] : 58 y/o F with knee left pain for 2 weeks. History of torn meniscus, had euflexxa shots to the left knee with some relieve. Has done PT for knee. FS this am 230. \par \par on plavix\par \par today for Orthovisc #2 L knee \par 7/28/22: L knee orthovisc #3 [] : Post Surgical Visit: no [FreeTextEntry1] : left knee  [de-identified] : physical therapy [de-identified] : 7/19/22

## 2022-07-28 NOTE — IMAGING
[de-identified] : Left knee exam: \par \par General: no distress, no ligamentous laxity\par Skin: no significant pertinent finding\par Inspection: \par    Effusion: (-)\par    Malalignment: (-)\par    Swelling: (-)\par    Quad atrophy: (-)\par    J-sign: (-)\par ROM: \par    0 - 135 degrees of flexion.\par Tenderness: \par    MJLT: +\par    LJLT: (-)\par    Medial patellar facet tenderness: (-)\par    Lateral patellar facet tenderness: (-)\par    Crepitus: (-)\par    Patellar grind tenderness: (-)\par Stability: \par    Lachman: (-)\par    Varus/Valgus instability: (-)\par    Posterior drawer: (-)\par Additional tests: \par    McMurrays test: equivocal\par    Patellar apprehension: (-)\par    Patellar tilt: (-)\par    Tight lateral retinaculum: (-)\par Strength: 5/5 Q/H/TA/GS/EHL\par Neuro: In tact to light touch throughout, DTR's wnl\par Vascularity: Extremity warm and well perfused\par Gait: normal.\par \par \par Thoracic/Lumbar spine exam: \par \par Skin: no significant pertinent finding\par Inspection: Abnormal alignment (kyphosis/lordosis): (-)     Atrophy: (-)     Masses: (-)\par ROM: \par    Pain:           Flexion/extension: (-)   .   Rotation: (-)    \par    Stiffness:   Flexion/extension: (-)   .   Rotation: (-)\par Tenderness/Spasm: \par    Midline: (-)\par    Lumbar paraspinal:         Right: (-).   Left: +\par    Thoracic paraspinal:       Right: (-)   .   Left: (-)   \par    PSIS:                               Right: (-)   .   Left: (-)\par    SI joint:                            Right: (-)   .   Left: (-)\par    Greater troch:                 Right: (-)   .   Left: +\par    Hamstring tightness: (-)\par Strength: (out of 5)\par    Illiopsoas:           Right: 5   .    Left: 5\par    Quad:                 Right: 5   .    Left: 5\par    Hamstrings:        Right: 5   .    Left: 5\par    Anterior tibialis:  Right: 5    .   Left: 5\par    Gastrocsoleus:  Right: 5    .   Left: 5\par    EHL:                    Right: 5    .   Left: 5\par Neuro: DTR's wnl.  Sensation to light touch grossly in tact in all distributions. \par    SLR: (-)\par    Femoral stretch: (-)\par Vascularity: Extremity warm and well perfused\par Gait: normal\par \par

## 2022-07-28 NOTE — PROCEDURE
[Large Joint Injection] : Large joint injection [Knee] : knee [Euflexxa] : Euflexxa [#3] : series #3

## 2022-07-28 NOTE — DISCUSSION/SUMMARY
[de-identified] : 56 y/o F presenting for left knee pain with acute flare. Unable to get CSI due to elevated sugars.History of euflexxa  shots with some relieve. PT rx ordered. submit for HA. will obtain new mri left knee to eval for tear progression and consider arthroscopic management. \par \par she has concomitant sciatic pain. we will give gabapentin and start PT\par evaluated and discused left knee mri . signifian menicus root tear with only mild oa. \par \par orthovisc #3 left knee today.\par \par The patient was advised of the diagnosis.  The natural history of the pathology was explained in full. All questions were answered.  The risks and benefits of conservative and interventional treatment alternatives were explained to the patient\par \par Viscosupplementation injection given:\par \par Viscosupplementation injection indications at this time include- X-ray evidence of osteoarthritis on this or prior visits, and patient having tried OTC's including aspirin, Ibuprofen, Aleve etc or prescription NSAIDS, and/or exercises at home and/ or physical therapy without satisfactory response.\par \par The risks, benefits, and alternatives to viscosupplementation injection were explained in full to the patient. Risks outlined include but are not limited to infection, sepsis, bleeding, scarring, skin discoloration, temporary increase in pain, syncopal episode, failure to resolve symptoms, allergic reaction, and symptom recurrence.  Patient understood the risks. All questions were answered. After discussion of options, the patient requested viscosupplementation. \par \par An injection of Hyaluronic acid of appropriate formulation was injected into the joint(s) after verbal consent, using sterile technique. The patient tolerated the procedure well and there were no complications.  Instructed patient to apply ice to the injection site. Signs and symptoms of infection reviewed and patient advised to call immediately for redness, fevers, and/or chills.\par \par

## 2022-08-09 ENCOUNTER — APPOINTMENT (OUTPATIENT)
Dept: ORTHOPEDIC SURGERY | Facility: CLINIC | Age: 57
End: 2022-08-09

## 2022-08-09 VITALS — HEIGHT: 63 IN | BODY MASS INDEX: 32.25 KG/M2 | WEIGHT: 182 LBS

## 2022-08-09 DIAGNOSIS — M17.12 UNILATERAL PRIMARY OSTEOARTHRITIS, LEFT KNEE: ICD-10-CM

## 2022-08-09 PROCEDURE — 99212 OFFICE O/P EST SF 10 MIN: CPT | Mod: 25

## 2022-08-09 PROCEDURE — 20610 DRAIN/INJ JOINT/BURSA W/O US: CPT

## 2022-08-09 NOTE — HISTORY OF PRESENT ILLNESS
[10] : 10 [9] : 9 [Dull/Aching] : dull/aching [Localized] : localized [Shooting] : shooting [Injection therapy] : injection therapy [Exercising] : exercising [Orthovisc] : Orthovisc [de-identified] : f/u L knee, patient of Dr. Weston for Orthovisc #4 today.  [Lying in bed] : lying in bed [3] : 3 [] : Post Surgical Visit: no [FreeTextEntry1] : left knee  [de-identified] : physical therapy [de-identified] : 7/19/22 [de-identified] : left knee

## 2022-08-09 NOTE — DISCUSSION/SUMMARY
[de-identified] : Orthovisc #4 L knee, tolerated well\par Ice to affected area.\par F/U Dr. Weston 6 weeks. \par

## 2022-08-09 NOTE — PROCEDURE
[FreeTextEntry3] : The risks, benefits and contents of the injection have been discussed.  Risks include but are not limited to allergic reaction, flare reaction, permanent white skin discoloration at the injection site and infection.  The patient understands the risks and agrees to having the injection.  All questions have been answered.\par \par Large joint injection was performed  of the L knee. The indication for this procedure was x-ray evidence of Osteoarthritis on this or prior visit. The site was prepped with alcohol and betadine. An injection of Orthovisc, #4 was used. \par \par Patient was advised to call if redness, pain or fever occur and apply ice for 15 minutes out of every hour for the next 12-24 hours as tolerated. \par \par Patient has tried OTC's including aspirin, Ibuprofen, Aleve, etc or prescription NSAIDS, and/or exercises at home and/or physical therapy without satisfactory response, patient had decreased mobility in the joint and the risks benefits, and alternatives have been discussed, and verbal consent was obtained. \par \par \par

## 2022-08-09 NOTE — IMAGING
[de-identified] : Left knee exam: \par \par General: no distress, no ligamentous laxity\par Skin: no significant pertinent finding\par Inspection: \par    Effusion: (-)\par    Malalignment: (-)\par    Swelling: (-)\par    Quad atrophy: (-)\par    J-sign: (-)\par ROM: \par    0 - 135 degrees of flexion.\par Tenderness: \par    MJLT: +\par    LJLT: (-)\par    Medial patellar facet tenderness: (-)\par    Lateral patellar facet tenderness: (-)\par    Crepitus: (-)\par    Patellar grind tenderness: (-)\par Stability: \par    Lachman: (-)\par    Varus/Valgus instability: (-)\par    Posterior drawer: (-)\par Additional tests: \par    McMurrays test: equivocal\par    Patellar apprehension: (-)\par    Patellar tilt: (-)\par    Tight lateral retinaculum: (-)\par Strength: 5/5 Q/H/TA/GS/EHL\par Neuro: In tact to light touch throughout, DTR's wnl\par Vascularity: Extremity warm and well perfused\par Gait: normal.\par \par \par Thoracic/Lumbar spine exam: \par \par Skin: no significant pertinent finding\par Inspection: Abnormal alignment (kyphosis/lordosis): (-)     Atrophy: (-)     Masses: (-)\par ROM: \par    Pain:           Flexion/extension: (-)   .   Rotation: (-)    \par    Stiffness:   Flexion/extension: (-)   .   Rotation: (-)\par Tenderness/Spasm: \par    Midline: (-)\par    Lumbar paraspinal:         Right: (-).   Left: +\par    Thoracic paraspinal:       Right: (-)   .   Left: (-)   \par    PSIS:                               Right: (-)   .   Left: (-)\par    SI joint:                            Right: (-)   .   Left: (-)\par    Greater troch:                 Right: (-)   .   Left: +\par    Hamstring tightness: (-)\par Strength: (out of 5)\par    Illiopsoas:           Right: 5   .    Left: 5\par    Quad:                 Right: 5   .    Left: 5\par    Hamstrings:        Right: 5   .    Left: 5\par    Anterior tibialis:  Right: 5    .   Left: 5\par    Gastrocsoleus:  Right: 5    .   Left: 5\par    EHL:                    Right: 5    .   Left: 5\par Neuro: DTR's wnl.  Sensation to light touch grossly in tact in all distributions. \par    SLR: (-)\par    Femoral stretch: (-)\par Vascularity: Extremity warm and well perfused\par Gait: normal\par \par

## 2022-09-14 ENCOUNTER — APPOINTMENT (OUTPATIENT)
Dept: GASTROENTEROLOGY | Facility: CLINIC | Age: 57
End: 2022-09-14

## 2022-09-14 VITALS
HEIGHT: 63 IN | BODY MASS INDEX: 31.54 KG/M2 | TEMPERATURE: 97.2 F | DIASTOLIC BLOOD PRESSURE: 74 MMHG | SYSTOLIC BLOOD PRESSURE: 171 MMHG | HEART RATE: 79 BPM | OXYGEN SATURATION: 99 % | WEIGHT: 178 LBS

## 2022-09-14 DIAGNOSIS — K76.0 FATTY (CHANGE OF) LIVER, NOT ELSEWHERE CLASSIFIED: ICD-10-CM

## 2022-09-14 DIAGNOSIS — K52.9 NONINFECTIVE GASTROENTERITIS AND COLITIS, UNSPECIFIED: ICD-10-CM

## 2022-09-14 PROCEDURE — 99214 OFFICE O/P EST MOD 30 MIN: CPT

## 2022-09-14 RX ORDER — HYDROCORTISONE 2.5% 25 MG/G
2.5 CREAM TOPICAL
Qty: 1 | Refills: 2 | Status: ACTIVE | COMMUNITY
Start: 2022-09-14 | End: 1900-01-01

## 2022-09-14 NOTE — ASSESSMENT
[FreeTextEntry1] : The patient received education including the attached :\par \par Nonalcoholic fatty liver disease (NAFLD) is an umbrella term for a range of liver conditions affecting people who drink little to no alcohol. As the name implies, the main characteristic of NAFLD is too much fat stored in liver cells.\par NAFLD is increasingly common around the world, especially in Western nations. In the [place], it is the most common form of chronic liver disease, affecting about one-quarter of the population.\par Some individuals with NAFLD can develop nonalcoholic steatohepatitis (HECK), an aggressive form of fatty liver disease, which is marked by liver inflammation and may progress to advanced scarring (cirrhosis) and liver failure. This damage is similar to the damage caused by heavy alcohol use.\par \par Choose a healthy diet. Choose a healthy plant-based diet that's rich in fruits, vegetables, whole grains and healthy fats. Maintain a healthy weight. If you are overweight or obese, reduce the number of calories you eat each day and get more exercise. If you have a healthy weight, work to maintain it by choosing a healthy diet and exercising. Exercise. Exercise most days of the week. Get an OK from your doctor first if you haven't been exercising regularly\par \par Sono \par \par labs\par \par I spent 40 minutes reviewing the patients records prior to arrival, with patient , and reviewing records after visit. All questions were answered.\par \par \par

## 2022-09-14 NOTE — PHYSICAL EXAM
[General Appearance - Alert] : alert [General Appearance - In No Acute Distress] : in no acute distress [Sclera] : the sclera and conjunctiva were normal [PERRL With Normal Accommodation] : pupils were equal in size, round, and reactive to light [Extraocular Movements] : extraocular movements were intact [Outer Ear] : the ears and nose were normal in appearance [Oropharynx] : the oropharynx was normal [Neck Appearance] : the appearance of the neck was normal [Neck Cervical Mass (___cm)] : no neck mass was observed [Jugular Venous Distention Increased] : there was no jugular-venous distention [Thyroid Diffuse Enlargement] : the thyroid was not enlarged [Thyroid Nodule] : there were no palpable thyroid nodules [Auscultation Breath Sounds / Voice Sounds] : lungs were clear to auscultation bilaterally [Heart Rate And Rhythm] : heart rate was normal and rhythm regular [Heart Sounds] : normal S1 and S2 [Heart Sounds Gallop] : no gallops [Murmurs] : no murmurs [Heart Sounds Pericardial Friction Rub] : no pericardial rub [Normal] : normal [Soft, Nontender] : the abdomen was soft and nontender [No Mass] : no masses were palpated [No HSM] : no hepatosplenomegaly noted [No CVA Tenderness] : no ~M costovertebral angle tenderness [No Spinal Tenderness] : no spinal tenderness [Abnormal Walk] : normal gait [Nail Clubbing] : no clubbing  or cyanosis of the fingernails [Musculoskeletal - Swelling] : no joint swelling seen [Motor Tone] : muscle strength and tone were normal [Skin Color & Pigmentation] : normal skin color and pigmentation [Skin Turgor] : normal skin turgor [] : no rash [Deep Tendon Reflexes (DTR)] : deep tendon reflexes were 2+ and symmetric [Sensation] : the sensory exam was normal to light touch and pinprick [No Focal Deficits] : no focal deficits [Oriented To Time, Place, And Person] : oriented to person, place, and time [Impaired Insight] : insight and judgment were intact [Affect] : the affect was normal

## 2022-09-14 NOTE — HISTORY OF PRESENT ILLNESS
[de-identified] : Hx NAFLD\par \par Last Colon last year \par \par Now Diarrhea on and off - says x years with mild RUQ pain

## 2022-09-19 ENCOUNTER — LABORATORY RESULT (OUTPATIENT)
Age: 57
End: 2022-09-19

## 2022-09-20 LAB
BACTERIA STL CULT: NORMAL
H PYLORI AG STL QL: NEGATIVE

## 2022-09-21 LAB
CALPROTECTIN FECAL: <16 UG/G
PANCREATIC ELASTASE, FECAL: 408

## 2022-10-05 ENCOUNTER — EMERGENCY (EMERGENCY)
Facility: HOSPITAL | Age: 57
LOS: 1 days | Discharge: HOME | End: 2022-10-05
Attending: EMERGENCY MEDICINE | Admitting: EMERGENCY MEDICINE

## 2022-10-05 VITALS
SYSTOLIC BLOOD PRESSURE: 191 MMHG | HEART RATE: 85 BPM | DIASTOLIC BLOOD PRESSURE: 75 MMHG | RESPIRATION RATE: 20 BRPM | TEMPERATURE: 98 F | OXYGEN SATURATION: 100 %

## 2022-10-05 VITALS — DIASTOLIC BLOOD PRESSURE: 83 MMHG | HEART RATE: 82 BPM | SYSTOLIC BLOOD PRESSURE: 167 MMHG

## 2022-10-05 LAB
ALBUMIN SERPL ELPH-MCNC: 4.3 G/DL — SIGNIFICANT CHANGE UP (ref 3.3–5)
ALP SERPL-CCNC: 44 U/L — SIGNIFICANT CHANGE UP (ref 40–120)
ALT FLD-CCNC: 33 U/L — SIGNIFICANT CHANGE UP (ref 4–33)
ANION GAP SERPL CALC-SCNC: 11 MMOL/L — SIGNIFICANT CHANGE UP (ref 7–14)
AST SERPL-CCNC: 24 U/L — SIGNIFICANT CHANGE UP (ref 4–32)
BASOPHILS # BLD AUTO: 0.04 K/UL — SIGNIFICANT CHANGE UP (ref 0–0.2)
BASOPHILS NFR BLD AUTO: 0.4 % — SIGNIFICANT CHANGE UP (ref 0–2)
BILIRUB SERPL-MCNC: 0.9 MG/DL — SIGNIFICANT CHANGE UP (ref 0.2–1.2)
BUN SERPL-MCNC: 11 MG/DL — SIGNIFICANT CHANGE UP (ref 7–23)
CALCIUM SERPL-MCNC: 9.7 MG/DL — SIGNIFICANT CHANGE UP (ref 8.4–10.5)
CHLORIDE SERPL-SCNC: 100 MMOL/L — SIGNIFICANT CHANGE UP (ref 98–107)
CO2 SERPL-SCNC: 27 MMOL/L — SIGNIFICANT CHANGE UP (ref 22–31)
CREAT SERPL-MCNC: 0.48 MG/DL — LOW (ref 0.5–1.3)
EGFR: 110 ML/MIN/1.73M2 — SIGNIFICANT CHANGE UP
EOSINOPHIL # BLD AUTO: 0.07 K/UL — SIGNIFICANT CHANGE UP (ref 0–0.5)
EOSINOPHIL NFR BLD AUTO: 0.7 % — SIGNIFICANT CHANGE UP (ref 0–6)
GLUCOSE SERPL-MCNC: 217 MG/DL — HIGH (ref 70–99)
HCT VFR BLD CALC: 40.9 % — SIGNIFICANT CHANGE UP (ref 34.5–45)
HGB BLD-MCNC: 14.3 G/DL — SIGNIFICANT CHANGE UP (ref 11.5–15.5)
IANC: 7.66 K/UL — HIGH (ref 1.8–7.4)
IMM GRANULOCYTES NFR BLD AUTO: 0.5 % — SIGNIFICANT CHANGE UP (ref 0–0.9)
LYMPHOCYTES # BLD AUTO: 1.48 K/UL — SIGNIFICANT CHANGE UP (ref 1–3.3)
LYMPHOCYTES # BLD AUTO: 15.4 % — SIGNIFICANT CHANGE UP (ref 13–44)
MAGNESIUM SERPL-MCNC: 1.6 MG/DL — SIGNIFICANT CHANGE UP (ref 1.6–2.6)
MCHC RBC-ENTMCNC: 29.5 PG — SIGNIFICANT CHANGE UP (ref 27–34)
MCHC RBC-ENTMCNC: 35 GM/DL — SIGNIFICANT CHANGE UP (ref 32–36)
MCV RBC AUTO: 84.5 FL — SIGNIFICANT CHANGE UP (ref 80–100)
MONOCYTES # BLD AUTO: 0.3 K/UL — SIGNIFICANT CHANGE UP (ref 0–0.9)
MONOCYTES NFR BLD AUTO: 3.1 % — SIGNIFICANT CHANGE UP (ref 2–14)
NEUTROPHILS # BLD AUTO: 7.66 K/UL — HIGH (ref 1.8–7.4)
NEUTROPHILS NFR BLD AUTO: 79.9 % — HIGH (ref 43–77)
NRBC # BLD: 0 /100 WBCS — SIGNIFICANT CHANGE UP (ref 0–0)
NRBC # FLD: 0 K/UL — SIGNIFICANT CHANGE UP (ref 0–0)
PHOSPHATE SERPL-MCNC: 4.1 MG/DL — SIGNIFICANT CHANGE UP (ref 2.5–4.5)
PLATELET # BLD AUTO: 207 K/UL — SIGNIFICANT CHANGE UP (ref 150–400)
POTASSIUM SERPL-MCNC: 4.2 MMOL/L — SIGNIFICANT CHANGE UP (ref 3.5–5.3)
POTASSIUM SERPL-SCNC: 4.2 MMOL/L — SIGNIFICANT CHANGE UP (ref 3.5–5.3)
PROT SERPL-MCNC: 6.7 G/DL — SIGNIFICANT CHANGE UP (ref 6–8.3)
RBC # BLD: 4.84 M/UL — SIGNIFICANT CHANGE UP (ref 3.8–5.2)
RBC # FLD: 12 % — SIGNIFICANT CHANGE UP (ref 10.3–14.5)
SODIUM SERPL-SCNC: 138 MMOL/L — SIGNIFICANT CHANGE UP (ref 135–145)
WBC # BLD: 9.6 K/UL — SIGNIFICANT CHANGE UP (ref 3.8–10.5)
WBC # FLD AUTO: 9.6 K/UL — SIGNIFICANT CHANGE UP (ref 3.8–10.5)

## 2022-10-05 PROCEDURE — 70496 CT ANGIOGRAPHY HEAD: CPT | Mod: 26,MA

## 2022-10-05 PROCEDURE — 99285 EMERGENCY DEPT VISIT HI MDM: CPT

## 2022-10-05 PROCEDURE — 70498 CT ANGIOGRAPHY NECK: CPT | Mod: 26,MA

## 2022-10-05 RX ORDER — MECLIZINE HCL 12.5 MG
25 TABLET ORAL ONCE
Refills: 0 | Status: COMPLETED | OUTPATIENT
Start: 2022-10-05 | End: 2022-10-05

## 2022-10-05 RX ORDER — MECLIZINE HCL 12.5 MG
1 TABLET ORAL
Qty: 15 | Refills: 0
Start: 2022-10-05 | End: 2022-10-09

## 2022-10-05 RX ADMIN — Medication 25 MILLIGRAM(S): at 14:40

## 2022-10-05 NOTE — ED PROVIDER NOTE - NSDCPRINTRESULTS_ED_ALL_ED
Well positioned. Patient requests all Lab, Cardiology, and Radiology Results on their Discharge Instructions

## 2022-10-05 NOTE — ED PROVIDER NOTE - NSFOLLOWUPCLINICS_GEN_ALL_ED_FT
[facility]  Neurology  [address] [place] [address] [address] [place] [address]  Phone: [phone]  Fax:

## 2022-10-05 NOTE — ED PROVIDER NOTE - SHIFT CHANGE DETAILS
RUTH: Patient signed out to Dr. Arredondo pending ct/cta and reassessment.  HD stable at time of signout.

## 2022-10-05 NOTE — ED PROVIDER NOTE - NSFOLLOWUPINSTRUCTIONS_ED_ALL_ED_FT
No signs of emergency medical condition on today's workup.  Presumptive diagnosis made, but further evaluation may be required by your primary care doctor or specialist for a definitive diagnosis.  Therefore, follow up as directed and if symptoms change/worsen or any emergency conditions, please return to the ER.    Dizziness    Dizziness can manifest as a feeling of unsteadiness or light-headedness. You may feel like you are about to faint. This condition can be caused by a number of things, including medicines, dehydration, or illness. Drink enough fluid to keep your urine clear or pale yellow. Do not drink alcohol and limit your caffeine intake. Avoid quick or sudden movements.  Rise slowly from chairs and steady yourself until you feel okay. In the morning, first sit up on the side of the bed.    SEEK IMMEDIATE MEDICAL CARE IF YOU HAVE ANY OF THE FOLLOWING SYMPTOMS: vomiting, changes in your vision or speech, weakness in your arms or legs, trouble speaking or swallowing, chest pain, abdominal pain, shortness of breath, sweating, bleeding, headache, neck pain, or fever.

## 2022-10-05 NOTE — ED PROVIDER NOTE - PROGRESS NOTE DETAILS
Sacha Cummings MD, PGY1  Reassessed patient. Not currently endorsing dizziness. States that the meclizine helped. Labs reassuring thus far. Awaiting CT read. Plan to dc home with meclizine Rx and neuro follow up if CT negative. Pt in agreement with plan. Sacha Cummings MD, PGY1  CT showing minor (less than 50%) narrowing of internal carotid, but no acute pathology. Ground glass opacities seen at lung apices. Also seen on CT in 2019. Following with PCP. No SOB, cough, fever.

## 2022-10-05 NOTE — ED PROVIDER NOTE - ATTENDING CONTRIBUTION TO CARE
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  Patient reports history of vertigo in past, improving with meclizine.  States she is having room spinning dizziness, intermittent, worse with head movement, relieved with rest, associated with vomiting.  Denies fever, chills, neck pain, recent illness, numbness, tingling, weakness, speech changes.     Vitals:   Reviewed    Exam:    GEN:  Non-toxic appearing, non-distressed, speaking full sentences, non-diaphoretic, AAOx3  HEENT:  NCAT, neck supple, EOMI, PERRLA, sclera anicteric, no conjunctival pallor or injection, no stridor, normal voice, no tonsillar exudate, uvula midline  CV:  regular rhythm and rate, s1/s2 audible, no murmurs, rubs or gallops, peripheral pulses 2+ and symmetric  PULM:  non-labored respirations, lungs clear to auscultation bilaterally, no wheezes, crackles or rales  ABD:  non distended, non-tender, no rebound, no guarding, negative Ruiz's sign, bowel sounds normal, no cvat  MSK:  no gross deformity, non-tender extremities and joints, range of motion grossly normal appearing, no extremity edema, extremities warm and well perfused   NEURO:  AAOx3, CN II-VI, VIII-XII intact, right sided facial droop, motor 5/5 in upper and lower extremities bilaterally, sensation grossly intact in extremities and trunk, finger to nose testing wnl, no nystagmus, negative Romberg, no pronator drift, no gait deficit  SKIN:  warm, dry, no rash or vesicles     A/P:  58 yo F PMH DM HTN 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  BP elevated.  Right sided facial droop on exam likely 2/2 history of bell's palsy (not new per patient).  Presentation most c/w vertigo.  Suspect peripheral etiology given intermittent sx, positional, history of vertigo in past, and non-focal neuro exam.  Low c/f hypertensive emergency or PRES.  Will obtain ct/cta given elevated bp to eval for hemorrhage or vertebral artery pathology, labs, supportive care.  Dispo pending.

## 2022-10-05 NOTE — ED PROVIDER NOTE - NS ED ROS FT
Gen: Denies fevers  CV: Denies chest pain  Skin: denies color changes  Resp: Denies SOB, cough  Endo: Denies increased urination  GI: +vomiting  Msk: Denies extremity pain  : Denies dysuria  Neuro: +nausea, dizziness  Psych: Denies mood changes  all other ROS negative unless indicated in HPI

## 2022-10-05 NOTE — ED PROVIDER NOTE - PATIENT PORTAL LINK FT
You can access the FollowMyHealth Patient Portal offered by [facility] by registering at the following website: [email or web address]. By joining [facility]’s FollowMyHealth portal, you will also be able to view your health information using other applications (apps) compatible with our system.

## 2022-10-05 NOTE — ED ADULT NURSE NOTE - OBJECTIVE STATEMENT
Received pt to intake 12, A+Ox4, ambulatory. C/O dizziness since this am, endorsing vomiting and lips tingling. hx of bells palsy with right side of the face noted to be flat. strength and sensations equal bilaterally.  20G to RAC, Labs sent, Medicated as per MD, will continue to monitor.

## 2022-10-05 NOTE — ED PROVIDER NOTE - CLINICAL SUMMARY MEDICAL DECISION MAKING FREE TEXT BOX
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 presents to ED with dizziness that began this morning upon waking up and getting out of bed. had nausea and vomiting after the dizziness. dizziness worse with movement. Had tinnitus yesterday. /75 at triage. Exam +dixhallpike and residual Rt sided lip droop from bells palsy. Likely BPPV but concern for posterior stroke given elevated blood pressure and acute onset dizziness. Will get basic labs, give meclizine and CTA head neck. Will reassess.

## 2022-10-05 NOTE — ED PROVIDER NOTE - OBJECTIVE STATEMENT
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 presents to ED with dizziness that began this morning upon waking up and getting out of bed. Pt then began to feel nauseous and had episode on NBNB vomiting. Has h/o BPPV in the past for which she took meclizine. Hasn't had episode in over 2 years so does not currently have any. Also noted tinnitus b/l ears the day before symptom onset. Called EMS this morning after the vomiting and was noted to have elevated BP to 200/90. Has h/o HTN which she states "is always high". Denies vision changes, HA, CP, SOB, abdominal pain, dysuria.

## 2022-10-05 NOTE — ED PROVIDER NOTE - PHYSICAL EXAMINATION
Gen: WDWN, NAD  HEENT: EOMI no nystagmus, no nasal discharge, mucous membranes moist  CV: RRR, +S1/S2, no M/R/G, 2+ radial pulses b/l  Resp: CTAB, no W/R/R, no accessory muscle use, no increased work of breathing  GI: Abdomen soft non-distended, NTTP  MSK: no LE edema  Neuro: Residual Rt sided lip droop from bells palsy (states baseline), rest CN intact. +dixhallpike with recreation of dizziness b/l sides. A&Ox4, following commands, moving all four extremities spontaneously. able to ambulate but has worsening of dizziness when leaning forward.   Psych: appropriate mood

## 2022-10-19 ENCOUNTER — NON-APPOINTMENT (OUTPATIENT)
Age: 57
End: 2022-10-19

## 2022-10-20 LAB — LACTOFERRIN STL-MCNC: <1

## 2023-12-20 ENCOUNTER — APPOINTMENT (OUTPATIENT)
Dept: PULMONOLOGY | Facility: CLINIC | Age: 58
End: 2023-12-20

## 2024-12-30 ENCOUNTER — APPOINTMENT (OUTPATIENT)
Dept: ORTHOPEDIC SURGERY | Facility: CLINIC | Age: 59
End: 2024-12-30
Payer: COMMERCIAL

## 2024-12-30 DIAGNOSIS — M17.12 UNILATERAL PRIMARY OSTEOARTHRITIS, LEFT KNEE: ICD-10-CM

## 2024-12-30 PROCEDURE — 73562 X-RAY EXAM OF KNEE 3: CPT | Mod: LT

## 2024-12-30 PROCEDURE — 99214 OFFICE O/P EST MOD 30 MIN: CPT

## 2024-12-31 RX ORDER — HYALURONATE SODIUM 20 MG/2 ML
20 SYRINGE (ML) INTRAARTICULAR
Qty: 3 | Refills: 0 | Status: ACTIVE | COMMUNITY
Start: 2024-12-31 | End: 1900-01-01

## 2025-02-28 ENCOUNTER — APPOINTMENT (OUTPATIENT)
Dept: ORTHOPEDIC SURGERY | Facility: CLINIC | Age: 60
End: 2025-02-28
Payer: COMMERCIAL

## 2025-02-28 VITALS — BODY MASS INDEX: 31.54 KG/M2 | HEIGHT: 63 IN | WEIGHT: 178 LBS

## 2025-02-28 DIAGNOSIS — M17.12 UNILATERAL PRIMARY OSTEOARTHRITIS, LEFT KNEE: ICD-10-CM

## 2025-02-28 PROCEDURE — 20610 DRAIN/INJ JOINT/BURSA W/O US: CPT | Mod: LT

## 2025-02-28 PROCEDURE — 99024 POSTOP FOLLOW-UP VISIT: CPT

## 2025-03-14 ENCOUNTER — APPOINTMENT (OUTPATIENT)
Dept: ORTHOPEDIC SURGERY | Facility: CLINIC | Age: 60
End: 2025-03-14

## 2025-03-14 DIAGNOSIS — M17.12 UNILATERAL PRIMARY OSTEOARTHRITIS, LEFT KNEE: ICD-10-CM

## 2025-03-14 PROCEDURE — 20610 DRAIN/INJ JOINT/BURSA W/O US: CPT | Mod: LT,NC

## 2025-03-14 PROCEDURE — 99024 POSTOP FOLLOW-UP VISIT: CPT

## 2025-03-14 RX ORDER — CLOPIDOGREL 75 MG/1
75 TABLET, FILM COATED ORAL
Refills: 0 | Status: ACTIVE | COMMUNITY

## 2025-03-21 ENCOUNTER — APPOINTMENT (OUTPATIENT)
Dept: ORTHOPEDIC SURGERY | Facility: CLINIC | Age: 60
End: 2025-03-21
Payer: COMMERCIAL

## 2025-03-21 VITALS — BODY MASS INDEX: 31.54 KG/M2 | HEIGHT: 63 IN | WEIGHT: 178 LBS

## 2025-03-21 DIAGNOSIS — M17.12 UNILATERAL PRIMARY OSTEOARTHRITIS, LEFT KNEE: ICD-10-CM

## 2025-03-21 PROCEDURE — 20610 DRAIN/INJ JOINT/BURSA W/O US: CPT | Mod: LT

## 2025-04-01 ENCOUNTER — APPOINTMENT (OUTPATIENT)
Dept: ORTHOPEDIC SURGERY | Facility: CLINIC | Age: 60
End: 2025-04-01
Payer: COMMERCIAL

## 2025-04-01 DIAGNOSIS — S63.639A SPRAIN OF INTERPHALANGEAL JOINT OF UNSPECIFIED FINGER, INITIAL ENCOUNTER: ICD-10-CM

## 2025-04-01 DIAGNOSIS — M79.645 PAIN IN LEFT FINGER(S): ICD-10-CM

## 2025-04-01 DIAGNOSIS — S56.119A STRAIN OF FLEXOR MUSCLE, FASCIA AND TENDON OF FINGER OF UNSPECIFIED FINGER AT FOREARM LEVEL, INITIAL ENCOUNTER: ICD-10-CM

## 2025-04-01 PROCEDURE — 73130 X-RAY EXAM OF HAND: CPT | Mod: F4

## 2025-04-01 PROCEDURE — 99204 OFFICE O/P NEW MOD 45 MIN: CPT

## 2025-04-08 ENCOUNTER — APPOINTMENT (OUTPATIENT)
Dept: MRI IMAGING | Facility: CLINIC | Age: 60
End: 2025-04-08

## 2025-04-24 ENCOUNTER — APPOINTMENT (OUTPATIENT)
Dept: ORTHOPEDIC SURGERY | Facility: CLINIC | Age: 60
End: 2025-04-24